# Patient Record
Sex: FEMALE
[De-identification: names, ages, dates, MRNs, and addresses within clinical notes are randomized per-mention and may not be internally consistent; named-entity substitution may affect disease eponyms.]

---

## 2024-01-17 ENCOUNTER — APPOINTMENT (OUTPATIENT)
Dept: OBGYN | Facility: CLINIC | Age: 24
End: 2024-01-17
Payer: COMMERCIAL

## 2024-01-17 VITALS
SYSTOLIC BLOOD PRESSURE: 115 MMHG | HEIGHT: 64 IN | BODY MASS INDEX: 24.07 KG/M2 | DIASTOLIC BLOOD PRESSURE: 76 MMHG | WEIGHT: 141 LBS | OXYGEN SATURATION: 97 % | HEART RATE: 80 BPM

## 2024-01-17 DIAGNOSIS — Q51.9 CONGENITAL MALFORMATION OF UTERUS AND CERVIX, UNSPECIFIED: ICD-10-CM

## 2024-01-17 PROBLEM — Z00.00 ENCOUNTER FOR PREVENTIVE HEALTH EXAMINATION: Status: ACTIVE | Noted: 2024-01-17

## 2024-01-17 PROCEDURE — 99203 OFFICE O/P NEW LOW 30 MIN: CPT

## 2024-01-17 NOTE — END OF VISIT
[FreeTextEntry3] : I, Fozia Nicholas, acted as a scribe on behalf of Dr. Radha Pace on 01/17/2024 .  All medical entries made by the scribe were at my, Dr. Radha Pace, direction and personally dictated by me on 01/17/2024. I have reviewed the chart and agree that the record accurately reflects my personal performance of the history, physical exam, assessment and plan. I have also personally directed, reviewed, and agreed with the chart.

## 2024-01-17 NOTE — PHYSICAL EXAM
[Appropriately responsive] : appropriately responsive [Alert] : alert [No Acute Distress] : no acute distress [Soft] : soft [Non-tender] : non-tender [Non-distended] : non-distended [No HSM] : No HSM [No Lesions] : no lesions [No Mass] : no mass [Oriented x3] : oriented x3 [Labia Majora] : normal [Labia Minora] : normal [Normal] : normal [Uterine Adnexae] : normal [FreeTextEntry1] : limited exam, able to insert a pediatric speculum fully in vagina, pt doesnt tolerate opening

## 2024-01-17 NOTE — PLAN
[FreeTextEntry1] : 24 y/o for consultation for pain   - Pt to get pelvic mri -discussed limited exam today -discussed and rec pelvic PT, dilator therapy -all questions answered f/u after MRI Radha Pace MD

## 2024-01-17 NOTE — HISTORY OF PRESENT ILLNESS
[FreeTextEntry1] : 22 y/o presents for consultation for ?vaginal septum. Pt states was sent by Dr Kessler. Virginal, getting  in March. Went for her first gyn exam with Dr Kessler and it was painful. Sent here for a second opinion regarding ?septum. Pt states has started OCP.  periods were regular, non painful. Only uses pads.  menarche age 15.  Pt states she wants this fixed before wedding in march   PMHx: none Meds: Zoloft